# Patient Record
Sex: FEMALE | Race: ASIAN | NOT HISPANIC OR LATINO | URBAN - METROPOLITAN AREA
[De-identification: names, ages, dates, MRNs, and addresses within clinical notes are randomized per-mention and may not be internally consistent; named-entity substitution may affect disease eponyms.]

---

## 2021-10-19 ENCOUNTER — OUTPATIENT (OUTPATIENT)
Dept: INPATIENT UNIT | Facility: HOSPITAL | Age: 31
LOS: 1 days | Discharge: HOME | End: 2021-10-19
Payer: COMMERCIAL

## 2021-10-19 VITALS
DIASTOLIC BLOOD PRESSURE: 59 MMHG | TEMPERATURE: 98 F | SYSTOLIC BLOOD PRESSURE: 104 MMHG | RESPIRATION RATE: 17 BRPM | HEART RATE: 71 BPM

## 2021-10-19 VITALS — SYSTOLIC BLOOD PRESSURE: 104 MMHG | DIASTOLIC BLOOD PRESSURE: 65 MMHG | HEART RATE: 74 BPM

## 2021-10-19 DIAGNOSIS — Z3A.00 WEEKS OF GESTATION OF PREGNANCY NOT SPECIFIED: ICD-10-CM

## 2021-10-19 DIAGNOSIS — O26.899 OTHER SPECIFIED PREGNANCY RELATED CONDITIONS, UNSPECIFIED TRIMESTER: ICD-10-CM

## 2021-10-19 PROCEDURE — 99213 OFFICE O/P EST LOW 20 MIN: CPT | Mod: 25

## 2021-10-19 PROCEDURE — 76818 FETAL BIOPHYS PROFILE W/NST: CPT | Mod: 26

## 2021-10-19 NOTE — OB PROVIDER TRIAGE NOTE - NS_FETALANOMAL_OBGYN_ALL_OB
"Requested Prescriptions   Pending Prescriptions Disp Refills     desonide (DESOWEN) 0.05 % external cream        Last Written Prescription Date:  05/31/18  Last Fill Quantity: 60g,   # refills: 11  Last Office Visit: 11/11/19[facility] Office visit:    Next 5 appointments (look out 90 days)    Feb 06, 2020  2:00 PM CST  Return Visit with Eliane Osman MD  [facility][note 3]) [address] [place] [address] [id number] [phone]           Routing refill request to provider for review/approval because:  Drug not on the [facility], [facility] or [facility] refill protocol or controlled substance 60 g 11     Sig: Apply sparingly to affected area three times daily as needed.       There is no refill protocol information for this order        ketoconazole (NIZORAL) 2 % external shampoo  Last Written Prescription Date:  09/11/18  Last Fill Quantity: 120ml,  # refills: 1   Last Office Visit with [facility] or [facility] prescribing provider:  11/11/19[facility] Office Visit:    Next 5 appointments (look out 90 days)    Feb 06, 2020  2:00 PM CST  Return Visit with Eliane Osman MD  [facility][note 3]) [address] [place] [address] [id number] [phone]        120 mL 1     Sig: Apply to the affected area and wash off after 5 minutes.       Antifungal Agents Passed - 11/15/2019  1:26 PM        Passed - Recent (12 mo) or future (30 days) visit within the authorizing provider's specialty     Patient has had an office visit with the authorizing provider or a provider within the authorizing providers department within the previous 12 mos or has a future within next 30 days. See \"Patient Info\" tab in inbasket, or \"Choose Columns\" in Meds & Orders section of the refill encounter.              Passed - Not Fluconazole or Terconazole      If oral Fluconazole or Terconazole, may refill if indicated in progress notes.           " Passed - Medication is active on med list           No

## 2021-10-19 NOTE — OB PROVIDER TRIAGE NOTE - NSOBPROVIDERNOTE_OBGYN_ALL_OB_FT
no evidence of active bleeding or acute process at this time  maternal and fetal status reassuring at this time  d/w 4th year OB resident MD Olea and MD Anguiano OB service attending  pt to be discharged home with general OB labor instructions  daily kick counts  pt to follow up with clinic next week, as scheduled  pt educated on having full prenatal records if she desires to deliver at this hospital

## 2021-10-19 NOTE — OB PROVIDER TRIAGE NOTE - NSHPPHYSICALEXAM_GEN_ALL_CORE
SSE: no blood visualized in vault  abdomen: gravid, soft, nontender  TAS: cephalic presentation  posterior placenta  ИВАН 12.49  BPP 8/8  FHT: category 1 tracing  TOCO: mild irregular contractions    Vital Signs Last 24 Hrs  T(C): 36.5 (19 Oct 2021 14:59), Max: 36.5 (19 Oct 2021 14:57)  T(F): 97.7 (19 Oct 2021 14:59), Max: 97.7 (19 Oct 2021 14:57)  HR: 74 (19 Oct 2021 15:49) (71 - 78)  BP: 104/65 (19 Oct 2021 15:49) (85/53 - 104/65)  BP(mean): --  RR: 17 (19 Oct 2021 14:57) (17 - 17)  SpO2: --

## 2021-10-19 NOTE — OB PROVIDER TRIAGE NOTE - HISTORY OF PRESENT ILLNESS
30 y/o  at 37.2 weeks gestation c/o vaginal bleeding x3 episodes dark brown streaks in underwear yesterday. Pt denies vaginal bleeding today and now currently. Denies recent sexual intercourse, trauma, falls, lof, ctx, and cramping. Pt receives PNC at [facility], but desires to deliver at this hospital. Pt is COVID vaccinated.    AP course uncomplicated thus far. GBS +, per patient  NKDA  Current medications:  -PNV  OBGYN history:  - 2019 8lbs 5oz  Medical history:  -PP depression, no therapies or medicines  Denies surgical history  Denies smoking, alcohol, and illicit drug use
